# Patient Record
(demographics unavailable — no encounter records)

---

## 2024-11-15 NOTE — DISCUSSION/SUMMARY
[de-identified] : The "lipstick technique" was discussed with the patient in detail, and a metatarsal pad was provided to the patient.  Patient declined oral nsaids. She is willing to try Voltaran gel and will use tylenol. Icing to affected area. Supportive sneakers.

## 2024-11-15 NOTE — PHYSICAL EXAM
[NL (40)] : plantar flexion 40 degrees [NL 30)] : inversion 30 degrees [5___] : [facility] 5[unfilled]/5 [2+] : posterior tibialis pulse: 2+ [Normal] : saphenous nerve sensation normal [Mild] : mild swelling of MTP joint/great toe [1st] : 1st [4___] : [facility] 4[unfilled]/5 [] : mildly antalgic [Right] : right foot [Weight -] : weightbearing [There are no fractures, subluxations or dislocations. No significant abnormalities are seen] : There are no fractures, subluxations or dislocations. No significant abnormalities are seen [de-identified] : Sesamoid view unremarkable [TWNoteComboBox7] : dorsiflexion 10 degrees [de-identified] : eversion 15 degrees

## 2025-02-06 NOTE — HISTORY OF PRESENT ILLNESS
[FreeTextEntry1] : 72 year female  f/u for osteoporosis management.   *** Feb 06, 2025 ***  Remains on Actonel 150 mg monthly ( started in 05/23) Denies jaw/thigh pains No dental issues.  DXA (12/23/2024)-LS (-3.0) with L1 (-3.1), L2 (-3.9), left femoral neck (-2.0), radius 33% 9 -2.6) *** images reviewed  DXA (12/8/2023)-lumbar spine (-3.2), left femoral neck (-2.1), right femoral neck (-2.0) DXA(12/22/22)- LS (-2.9), LFN (-2.1), RFN (-1.9) DXA (12/21/20)- LS (-4.0), FN (-2.1)  *** Jerrod 10, 2024 ***  switched to Actonel 150 mg q mo no new c/o. no jaw/thigh pain no kidney stones s/p left 4th toe fracture in November after banging her toe  DXA (12/8/2023)-lumbar spine (-3.2), left femoral neck (-2.1), right femoral neck (-2.0) DXA(12/22/22)- LS (-2.9), LFN (-2.1), RFN (-1.9) DXA (12/21/20)- LS (-4.0), FN (-2.1)  *** May 03, 2023 ***  decided not to take her last Evenity injection (concerned with the cost) feels very well overall. no thigh or jaw pains.   *** Apr 03, 2023 ***  Patient returned for Evenity injection # 11 feels very well overall. no thigh or jaw pains.  Once again, risks and benefits were reviewed in details. REMS provided.  *** Evenity 105 mg/1.17 ml Lot # [id number] Exp. 6/25   *** Feb 13, 2023 ***  last Evenity in october, was expensive   DXA(12/22/22)- LS (-2.9), LFN (-2.1), RFN (-1.9)  *** Oct 10, 2022 ***  Patient returned for Evenity injection # 9 feels very well overall. no thigh or jaw pains. LBP is better since starting Evenity Once again, risks and benefits were reviewed in details. REMS provided.  *** Evenity 105 mg/1.17 ml Lot # [id number] Exp. 9/24  *** Sep 08, 2022 ***  Patient returned for Evenity injection # 8 *** Evenity 105 mg/ml Lot # [id number] Exp. 7/24  feels very well overall. no thigh or jaw pains Once again, risks and benefits were reviewed in details. REMS provided.  *** Jul 11, 2022 ***  Patient returned for Evenity injection # 7 did not have any lip swelling after the last injection. did not see an allergist yet feels very well overall. no thigh or jaw pains  Once again, risks and benefits were reviewed in details. REMS provided.  *** Evenity 105 mg/ml Lot # [id number] Exp. 6/24  *** Jun 06, 2022 ***  Patient returned for Evenity injection #6 went to [place] post last injection and 14 days after the injection noticed an upper lip swelling- resolved on its own. Had shell fish the day before. did not see an allergist yet  Once again, risks and benefits were reviewed in details. REMS provided.  *** Evenity 105 mg/ml Lot # [id number] Exp. 6/24  *** May 05, 2022 ***  Patient returned for Evenity injection #5. had an upper lip swelling 3 days after the last shot. no other symptoms, resolved with benadryl. With seasonal allergies now. taking Allegra PRN  Once again, risks and benefits were reviewed in details. REMS provided.  *** Evenity 105 mg/ml Lot # [id number] Exp. 7/24  *** Apr 07, 2022 ***  Patient returned for Evenity injection #4. tolerates well. no c/o. Wants to discuss last labs Once again, risks and benefits were reviewed in details. REMS provided.  *** Evenity 105 mg/ml Lot # [id number] Exp. 5/24  *** Mar 10, 2022 ***  Patient returned for Evenity injection #3. tolerates well. no c/o Once again, risks and benefits were reviewed in details. REMS provided.  *** Evenity 105 mg/ml Lot # [id number] Exp. 4/24   *** Feb 10, 2022 ***  Patient returned for Evenity injection. tolerated 1st injection well. no c/o Once again, risks and benefits were reviewed in details. REMS provided.  *** Evenity 105 mg/ml Lot # [id number] Exp. 4/24  *** Jerrod 10, 2022 ***  feels fine. stopped cholrthalidone b/o lightheadedness  Patient returned for PE and  Evenity injection. Once again, risks and benefits were reviewed in details. REMS provided.  *** Evenity 105 mg/ml Lot # [id number] Exp. 4/24  repeated 24h [facility]- 238 ([facility]) 2D-Echo (8/6/21)- normal EF, mild concentric LVH  *** Jul 12, 2021 ***  repeated 24h [facility] ([facility])- 387 feels fine, no new c/o. Planned for another echo at the beginning of August no more plans for dental work  HPI: She  was diagnosed with osteopenia in her late 40's. On Fosamax for a couple of years, switched to monthly bisphosphonates (probably Boniva) and took that for several years. Stopped her ART more than 10 years because her BMD improved. She was taking ca/D supplements, exercising daily.  Cancer history: none Calcium and vitamin D supplementation: 1200 mg + D Fracture history: none Bone disease risk factors: no history of kidney stones, liver disease, kidney disease, thyroid disease, anticonvulsant use, glucocorticoid use, autoimmune disorders such as rheumatoid arthritis and SLE, COPD, IBD, known malabsorption disorders, or weight loss. She does not smoke. Currently, no symptoms of polyuria, polydipsia, constipation, abdominal pain, mental confusion, depression, bone pain or fractures.  She  denies height loss, hot flashes, leg swelling, blood clots, history of radiation therapy. Lab:  calcium- 9.7 DXA (12/21/20)- LS (-4.0), FN (-2.1) DXA (3/4/14)- LS (-3.3), FN (-1.8) DXA (11/15/11)- LS (-2.7), FN (-1.4)

## 2025-02-06 NOTE — ASSESSMENT
[FreeTextEntry1] : Severe osteoporosis. Idiopathic hypercalciuria - had an excellent improvement in spine BMD after 9 injections of evenity. However, she had a gap of several months before getting injection # 10, and declined getting the last injection d/t the cost, which can explain the worsening in BMD - we reviewed a maintenance therapy with IV or oral bisphosphonates or Prolia - patient's preferred option is oral b/phos and would like to avoid Reclast or Prolia injections - cont Actonel 150 mg q mo (started in 05/23). R+B, proper intake reviewed - would avoid PTH analogues for now - repeat DXA 3 sites in 12/26 - calcium 500 mg bid, add extra OTC vitamin D3 2,000 IU/day - continue weight-bearing exercises; advised avoiding high risk sports - she wants to hold on Chlorthalidone for now. Will monitor 24 hrs U ca - labs next week RTC 1 year for a follow up visit

## 2025-02-17 NOTE — HISTORY OF PRESENT ILLNESS
[FreeTextEntry1] : Annual physical [de-identified] : 72y/F with PMH of osteoporosis, osteoarthritis, HTN presented for annual physical f/u with Dr Tan for osteoporosis, s/p evenity  C/o pain in right foot- plantar fasciitis S/p cough and cold in 12/2024- went to Urgent care, was treated with Antibiotics No active issues C/o hearing problems Seen by Endocrinologist on 02/205 for Osteoporosis management Currently Olmesartan Medoxomil 20 MG Oral Tablet; TAKE 1 TABLET DAILY (OFFICE VISIT NEEDED FOR FURTHER REFILLS) Risedronate Sodium 150 MG Oral Tablet; 1 tab PO once a month as directed. Take with a full glass of water, at least 30' before the first meal

## 2025-02-17 NOTE — REVIEW OF SYSTEMS
[Hearing Loss] : hearing loss [Joint Pain] : joint pain [Fever] : no fever [Chills] : no chills [Fatigue] : no fatigue [Hot Flashes] : no hot flashes [Night Sweats] : no night sweats [Recent Change In Weight] : ~T no recent weight change [Discharge] : no discharge [Pain] : no pain [Redness] : no redness [Vision Problems] : no vision problems [Itching] : no itching [Earache] : no earache [Nosebleed] : no nosebleeds [Hoarseness] : no hoarseness [Nasal Discharge] : no nasal discharge [Sore Throat] : no sore throat [Postnasal Drip] : no postnasal drip [Chest Pain] : no chest pain [Palpitations] : no palpitations [Leg Claudication] : no leg claudication [Lower Ext Edema] : no lower extremity edema [Orthopnea] : no orthopnea [Shortness Of Breath] : no shortness of breath [Wheezing] : no wheezing [Cough] : no cough [Dyspnea on Exertion] : no dyspnea on exertion [Abdominal Pain] : no abdominal pain [Nausea] : no nausea [Constipation] : no constipation [Diarrhea] : diarrhea [Vomiting] : no vomiting [Heartburn] : no heartburn [Melena] : no melena [Dysuria] : no dysuria [Incontinence] : no incontinence [Nocturia] : no nocturia [Hematuria] : no hematuria [Frequency] : no frequency [Joint Stiffness] : no joint stiffness [Joint Swelling] : no joint swelling [Muscle Weakness] : no muscle weakness [Muscle Pain] : no muscle pain [Itching] : no itching [Mole Changes] : no mole changes [Skin Rash] : no skin rash [Headache] : no headache [Dizziness] : no dizziness [Fainting] : no fainting [Unsteady Walking] : no ataxia [Suicidal] : not suicidal [Insomnia] : no insomnia [Anxiety] : no anxiety [Depression] : no depression [Easy Bleeding] : no easy bleeding [Easy Bruising] : no easy bruising [Swollen Glands] : no swollen glands [FreeTextEntry9] : has osteoarthritis, relieved with tylenol

## 2025-02-17 NOTE — HEALTH RISK ASSESSMENT
[Very Good] : ~his/her~ current health as very good [Good] : ~his/her~  mood as  good [Intercurrent Urgi Care visits] : went to urgent care [Yes] : Yes [1 or 2 (0 pts)] : 1 or 2 (0 points) [Never (0 pts)] : Never (0 points) [No] : In the past 12 months have you used drugs other than those required for medical reasons? No [No falls in past year] : Patient reported no falls in the past year [Little interest or pleasure doing things] : 1) Little interest or pleasure doing things [Feeling down, depressed, or hopeless] : 2) Feeling down, depressed, or hopeless [0] : 2) Feeling down, depressed, or hopeless: Not at all (0) [PHQ-2 Negative - No further assessment needed] : PHQ-2 Negative - No further assessment needed [Never] : Never [No Retinopathy] : No retinopathy [Patient reported mammogram was normal] : Patient reported mammogram was normal [Patient reported PAP Smear was normal] : Patient reported PAP Smear was normal [Patient reported bone density results were abnormal] : Patient reported bone density results were abnormal [HIV test declined] : HIV test declined [Hepatitis C test declined] : Hepatitis C test declined [None] : None [With Significant Other] : lives with significant other [Retired] : retired [] :  [# Of Children ___] : has [unfilled] children [Feels Safe at Home] : Feels safe at home [Fully functional (bathing, dressing, toileting, transferring, walking, feeding)] : Fully functional (bathing, dressing, toileting, transferring, walking, feeding) [Reports changes in hearing] : Reports changes in hearing [Smoke Detector] : smoke detector [Carbon Monoxide Detector] : carbon monoxide detector [Seat Belt] :  uses seat belt [Sunscreen] : uses sunscreen [Designated Healthcare Proxy] : Designated healthcare proxy [Name: ___] : Health Care Proxy's Name: [unfilled]  [Relationship: ___] : Relationship: [unfilled] [Aggressive treatment] : aggressive treatment [Time Spent: ___ minutes] : Time Spent: [unfilled] minutes [Reviewed no changes] : Reviewed, no changes [Monthly or less (1 pt)] : Monthly or less (1 point) [Time Spent: ___ Minutes] : I spent [unfilled] minutes performing a depression screening for this patient. [de-identified] : broken fourth toe 3 weeks ago [Audit-CScore] : 1 [de-identified] : workout  [de-identified] : fishes, veggies  fruit, chicken, seafood, occassionally red meat [[id number]] : 0 [EyeExamDate] : 2024 [Change in mental status noted] : No change in mental status noted [Language] : denies difficulty with language [Behavior] : denies difficulty with behavior [Learning/Retaining New Information] : denies difficulty learning/retaining new information [Reports changes in vision] : Reports no changes in vision [Reports normal functional visual acuity (ie: able to read med bottle)] : Reports poor functional visual acuity.  [Reports changes in dental health] : Reports no changes in dental health [Travel to Developing Areas] : does not  travel to developing areas [TB Exposure] : is not being exposed to tuberculosis [MammogramDate] : 01/01/2023 [PapSmearDate] : 01/01/2023 [BoneDensityDate] : 12/2024 [BoneDensityComments] : osteoporosis- improving [ColonoscopyDate] : 12/2023 [ColonoscopyComments] : Cologuard test - negative [AdvancecareDate] : 02/2025

## 2025-02-17 NOTE — PHYSICAL EXAM
[No Acute Distress] : no acute distress [Well Nourished] : well nourished [Normal Sclera/Conjunctiva] : normal sclera/conjunctiva [PERRL] : pupils equal round and reactive to light [Normal Outer Ear/Nose] : the outer ears and nose were normal in appearance [Normal Oropharynx] : the oropharynx was normal [No Lymphadenopathy] : no lymphadenopathy [Thyroid Normal, No Nodules] : the thyroid was normal and there were no nodules present [No Respiratory Distress] : no respiratory distress  [No Accessory Muscle Use] : no accessory muscle use [Clear to Auscultation] : lungs were clear to auscultation bilaterally [Normal Rate] : normal rate  [Regular Rhythm] : with a regular rhythm [Normal S1, S2] : normal S1 and S2 [Pedal Pulses Present] : the pedal pulses are present [No Edema] : there was no peripheral edema [Declined Breast Exam] : declined breast exam  [Soft] : abdomen soft [Non Tender] : non-tender [Non-distended] : non-distended [No Masses] : no abdominal mass palpated [No Joint Swelling] : no joint swelling [Grossly Normal Strength/Tone] : grossly normal strength/tone [Normal Affect] : the affect was normal [Normal Insight/Judgement] : insight and judgment were intact [Comprehensive Foot Exam Normal] : Right and left foot were examined and both feet are normal. No ulcers in either foot. Toes are normal and with full ROM.  Normal tactile sensation with monofilament testing throughout both feet

## 2025-02-17 NOTE — COUNSELING
[Fall prevention counseling provided] : Fall prevention counseling provided [Adequate lighting] : Adequate lighting [Use proper foot wear] : Use proper foot wear [Behavioral health counseling provided] : Behavioral health counseling provided [Engage in a relaxing activity] : Engage in a relaxing activity [Potential consequences of obesity discussed] : Potential consequences of obesity discussed [Benefits of weight loss discussed] : Benefits of weight loss discussed [Structured Weight Management Program suggested:] : Structured weight management program suggested [Weigh Self Weekly] : weigh self weekly [Decrease Portions] : decrease portions [Keep Food Diary] : keep food diary [None] : None

## 2025-02-27 NOTE — HISTORY OF PRESENT ILLNESS
[4] : 4 [0] : 0 [] : yes [de-identified] : 2/27/25: Here to f/up b/l knees. Reports that she did obtain relief s/p series of Visco-3 injections completed in June. She feels that she has started to experience a gradual return of pain, denies any new injury or trauma.  6/20/24: Pt here to f/u L knee, and visco 3 #2 . States able to sleep through night now, and as long as stays active at the gym, knees feel ok. Ices after gym to minimize symptoms.  6/13/24: here for f/up for b/l knees and visco-3 #1 5/14/24: Here for f/up for b/l knees. Reports she is interested in gel injections. Has returned from vacation.  04/04/2024 Ms. WILFRIDO VELASCO, a 71 year old female, presents today for b/l knees. Reports history of intermittent b/l knee pain, known OA. Saw Dr. Simon last month, visco injections denied by insurance. Pt interested in possible appraio program or other treatment options. Reports pain is L>R.  [FreeTextEntry1] : RICHIE knees  [FreeTextEntry5] : much relief from visco inj on left knee. Right pain worsen in the past month.

## 2025-02-27 NOTE — IMAGING
[Bilateral] : knee bilaterally [All Views] : anteroposterior, lateral, skyline, and anteroposterior standing [There are no fractures, subluxations or dislocations. No significant abnormalities are seen] : There are no fractures, subluxations or dislocations. No significant abnormalities are seen [Degenerative change] : Degenerative change

## 2025-02-27 NOTE — PHYSICAL EXAM
[Bilateral] : knee bilaterally [] : no extensor lag [TWNoteComboBox7] : flexion 120 degrees [de-identified] : extension 3 degrees

## 2025-02-27 NOTE — DISCUSSION/SUMMARY
[de-identified] : 72f with acute exacerbation of b/l knee djd.  Hx of relief with visco injections in the past Arthritis is a progressive problem that once occurs will be a chronic issue that will likely continue until surgical treatment is necessary. Treatment options for arthritis include OTC NSAIDS, prescription NSAIDS, ice, bracing, physical therapy, cortisone and/or visco injections, and surgery.  1) discussed availability of visco injections and pt would like to proceed. will request auth for a series of injections.  2) cryotherapy, rest and activity modification  3) hep  4) rtc with auth for injections   Entered by Sheridan Monterroso acting as scribe. Dr. Ledbetter- The documentation recorded by the scribe accurately reflects the service I personally performed and the decisions made by me.

## 2025-03-13 NOTE — PROCEDURE
[FreeTextEntry3] : Large joint injection was performed of the right knee. The indication for this procedure was pain, inflammation and x-ray evidence of Osteoarthritis on this or prior visit. The site was prepped with alcohol, betadine, ethyl chloride sprayed topically and sterile technique used. An injection of Lidocaine 3cc of 1% , 25mg of Visco-3, series # 1 was used.  Patient tolerated procedure well.    Patient has tried OTC's including aspirin, Ibuprofen, Aleve, etc or prescription NSAIDS, and/or exercises at home and/or physical therapy without satisfactory response, patient had decreased mobility in the joint and the risks benefits, and alternatives have been discussed, and verbal consent was obtained.   Ultrasound guidance was indicated for this patient due to prior failure or difficult injection. All ultrasound images have been permanently captured and stored accordingly in our picture archiving and communication system. Visualization of the needle and placement of injection was performed without complication.

## 2025-03-13 NOTE — PHYSICAL EXAM
[Bilateral] : knee bilaterally [] : no extensor lag [TWNoteComboBox7] : flexion 120 degrees [de-identified] : extension 3 degrees

## 2025-03-13 NOTE — DISCUSSION/SUMMARY
[de-identified] : 72f with right knee djd.  Hx of relief with visco injections in the past Arthritis is a progressive problem that once occurs will be a chronic issue that will likely continue until surgical treatment is necessary. Treatment options for arthritis include OTC NSAIDS, prescription NSAIDS, ice, bracing, physical therapy, cortisone and/or visco injections, and surgery. Visco-3 #1 Injection tolerated well. Post injection instructions reviewed. 1) wbat, cryotherapy 2) rtc 1 week to continue series.  Entered by Sheridan Monterroso acting as scribe. Dr. Ledbetter- The documentation recorded by the scribe accurately reflects the service I personally performed and the decisions made by me.

## 2025-03-13 NOTE — HISTORY OF PRESENT ILLNESS
[4] : 4 [0] : 0 [] : yes [de-identified] : 3/13/25: Here to f/up right knee. Reports continued right knee pain and stiffness.  2/27/25: Here to f/up b/l knees. Reports that she did obtain relief s/p series of Visco-3 injections completed in June. She feels that she has started to experience a gradual return of pain, denies any new injury or trauma.  6/20/24: Pt here to f/u L knee, and visco 3 #2 . States able to sleep through night now, and as long as stays active at the gym, knees feel ok. Ices after gym to minimize symptoms.  6/13/24: here for f/up for b/l knees and visco-3 #1 5/14/24: Here for f/up for b/l knees. Reports she is interested in gel injections. Has returned from vacation.  04/04/2024 Ms. WILFRIDO VELASCO, a 71 year old female, presents today for b/l knees. Reports history of intermittent b/l knee pain, known OA. Saw Dr. Simon last month, visco injections denied by insurance. Pt interested in possible appraio program or other treatment options. Reports pain is L>R.  [FreeTextEntry1] : RICHIE knees

## 2025-03-20 NOTE — PROCEDURE
[FreeTextEntry3] : Large joint injection was performed of the right knee. The indication for this procedure was pain, inflammation and x-ray evidence of Osteoarthritis on this or prior visit. The site was prepped with alcohol, betadine, ethyl chloride sprayed topically and sterile technique used. An injection of Lidocaine 3cc of 1% , 25mg of Visco-3, series # 2 was used.  Patient tolerated procedure well.    Patient has tried OTC's including aspirin, Ibuprofen, Aleve, etc or prescription NSAIDS, and/or exercises at home and/or physical therapy without satisfactory response, patient had decreased mobility in the joint and the risks benefits, and alternatives have been discussed, and verbal consent was obtained.   Ultrasound guidance was indicated for this patient due to prior failure or difficult injection. All ultrasound images have been permanently captured and stored accordingly in our picture archiving and communication system. Visualization of the needle and placement of injection was performed without complication.

## 2025-03-20 NOTE — DISCUSSION/SUMMARY
[de-identified] : 72f with right knee djd.  Hx of relief with visco injections in the past Arthritis is a progressive problem that once occurs will be a chronic issue that will likely continue until surgical treatment is necessary. Treatment options for arthritis include OTC NSAIDS, prescription NSAIDS, ice, bracing, physical therapy, cortisone and/or visco injections, and surgery.  Visco-3 #2 Injection tolerated well. Post injection instructions reviewed. 1) wbat, cryotherapy 2) rtc 1 week to continue series.  Entered by Sheridan Monterroso acting as scribe. Dr. Ledbetter- The documentation recorded by the scribe accurately reflects the service I personally performed and the decisions made by me.

## 2025-03-20 NOTE — HISTORY OF PRESENT ILLNESS
[4] : 4 [0] : 0 [] : yes [de-identified] : 3/20/25: Here to f/up right knee and visco-3 #2 3/13/25: Here to f/up right knee. Reports continued right knee pain and stiffness.  2/27/25: Here to f/up b/l knees. Reports that she did obtain relief s/p series of Visco-3 injections completed in June. She feels that she has started to experience a gradual return of pain, denies any new injury or trauma.  6/20/24: Pt here to f/u L knee, and visco 3 #2 . States able to sleep through night now, and as long as stays active at the gym, knees feel ok. Ices after gym to minimize symptoms.  6/13/24: here for f/up for b/l knees and visco-3 #1 5/14/24: Here for f/up for b/l knees. Reports she is interested in gel injections. Has returned from vacation.  04/04/2024 Ms. WILFRIDO VELASCO, a 71 year old female, presents today for b/l knees. Reports history of intermittent b/l knee pain, known OA. Saw Dr. Simon last month, visco injections denied by insurance. Pt interested in possible appraio program or other treatment options. Reports pain is L>R.  [FreeTextEntry1] : RICHIE knees  [FreeTextEntry5] : follow up visit, here for visco-3 #2

## 2025-03-20 NOTE — PHYSICAL EXAM
[Bilateral] : knee bilaterally [] : no extensor lag [TWNoteComboBox7] : flexion 120 degrees [de-identified] : extension 3 degrees

## 2025-03-24 NOTE — HISTORY OF PRESENT ILLNESS
[FreeTextEntry1] : 72F with HTN, HLD who presents for follow up janet  Last seen in February 2022 Feeling well overall Admits to generalized fatigue, does not sleep well at night No further CP. Rare palpitations. Denies dyspnea, dizziness, or exertional symptoms.  Now taking Risendronate once a month for osteoporosis   HISTORY:  Bone density in December with worsening osteoporosis, seeing endo, recommending PTH analogue Also noted on urine study with hypercalcuria, now recommending a thiazide diuretic  Never smoker. Denies family history of CAD. Works part time in retail.   ECG: SR with PRWP TTE 8/21: 60-65%, mild LVH  Olmesartan 20mg

## 2025-03-24 NOTE — DISCUSSION/SUMMARY
[FreeTextEntry1] : HTN:  Well controlled, continue Olmesartan 20mg. Normal CMP.    HLD: Lipids elevated, . Would likely benefit from statin. Pt reluctant to start due to side effects. Will plan for CAC to better guide. Continue to encourage diet, exercise  Mild LVH: Stable at last echo, would repeat study. Pt notes she had one recently, no records are available. She will try to obtain a copy and send it here  RV 6M

## 2025-03-27 NOTE — DISCUSSION/SUMMARY
[de-identified] : 72f with right knee djd.  Hx of relief with visco injections in the past Arthritis is a progressive problem that once occurs will be a chronic issue that will likely continue until surgical treatment is necessary. Treatment options for arthritis include OTC NSAIDS, prescription NSAIDS, ice, bracing, physical therapy, cortisone and/or visco injections, and surgery.  Visco-3 #3 Injection tolerated well. Post injection instructions reviewed. 1) wbat, cryotherapy 2) rtc 1 week to continue series.  Entered by Sheridan Monterroso acting as scribe. Dr. Ledbetter- The documentation recorded by the scribe accurately reflects the service I personally performed and the decisions made by me.

## 2025-03-27 NOTE — PHYSICAL EXAM
[Bilateral] : knee bilaterally [] : no extensor lag [TWNoteComboBox7] : flexion 120 degrees [de-identified] : extension 3 degrees

## 2025-03-27 NOTE — PROCEDURE
[FreeTextEntry3] : Large joint injection was performed of the right knee. The indication for this procedure was pain, inflammation and x-ray evidence of Osteoarthritis on this or prior visit. The site was prepped with alcohol, betadine, ethyl chloride sprayed topically and sterile technique used. An injection of Lidocaine 3cc of 1% , 25mg of Visco-3, series # 3 was used.  Patient tolerated procedure well.    Patient has tried OTC's including aspirin, Ibuprofen, Aleve, etc or prescription NSAIDS, and/or exercises at home and/or physical therapy without satisfactory response, patient had decreased mobility in the joint and the risks benefits, and alternatives have been discussed, and verbal consent was obtained.   Ultrasound guidance was indicated for this patient due to prior failure or difficult injection. All ultrasound images have been permanently captured and stored accordingly in our picture archiving and communication system. Visualization of the needle and placement of injection was performed without complication.

## 2025-03-27 NOTE — HISTORY OF PRESENT ILLNESS
[4] : 4 [0] : 0 [] : yes [de-identified] : 3/27/25: Here to f/up right knee and visco-3 #3 3/20/25: Here to f/up right knee and visco-3 #2 3/13/25: Here to f/up right knee. Reports continued right knee pain and stiffness.  2/27/25: Here to f/up b/l knees. Reports that she did obtain relief s/p series of Visco-3 injections completed in June. She feels that she has started to experience a gradual return of pain, denies any new injury or trauma.  6/20/24: Pt here to f/u L knee, and visco 3 #2 . States able to sleep through night now, and as long as stays active at the gym, knees feel ok. Ices after gym to minimize symptoms.  6/13/24: here for f/up for b/l knees and visco-3 #1 5/14/24: Here for f/up for b/l knees. Reports she is interested in gel injections. Has returned from vacation.  04/04/2024 Ms. WILFRIDO VELASCO, a 71 year old female, presents today for b/l knees. Reports history of intermittent b/l knee pain, known OA. Saw Dr. Simon last month, visco injections denied by insurance. Pt interested in possible appraio program or other treatment options. Reports pain is L>R.  [FreeTextEntry1] : RICHIE knees  [FreeTextEntry5] : follow up visit, here for visco-3 #2

## 2025-05-23 NOTE — DISCUSSION/SUMMARY
[de-identified] : 72f with right knee djd.  Hx of relief with visco injections in the past Arthritis is a progressive problem that once occurs will be a chronic issue that will likely continue until surgical treatment is necessary. Treatment options for arthritis include OTC NSAIDS, prescription NSAIDS, ice, bracing, physical therapy, cortisone and/or visco injections, and surgery.   1) wbat, cryotherapy, progress activity as tolerated, may return to the gym. Tylenol prn, unable to take NSAID's  2) rtc prn  Entered by Sheridan Monterroso acting as scribe. Dr. Ledbetter- The documentation recorded by the scribe accurately reflects the service I personally performed and the decisions made by me.

## 2025-05-23 NOTE — HISTORY OF PRESENT ILLNESS
[0] : 0 [] : yes [de-identified] : 5/22/25: Here to f/u R knee pain. Completed Visco-3 last visit with 90% relief. Reports no pain recently.  3/27/25: Here to f/up right knee and visco-3 #3 3/20/25: Here to f/up right knee and visco-3 #2 3/13/25: Here to f/up right knee. Reports continued right knee pain and stiffness.  2/27/25: Here to f/up b/l knees. Reports that she did obtain relief s/p series of Visco-3 injections completed in June. She feels that she has started to experience a gradual return of pain, denies any new injury or trauma.  6/20/24: Pt here to f/u L knee, and visco 3 #2 . States able to sleep through night now, and as long as stays active at the gym, knees feel ok. Ices after gym to minimize symptoms.  6/13/24: here for f/up for b/l knees and visco-3 #1 5/14/24: Here for f/up for b/l knees. Reports she is interested in gel injections. Has returned from vacation.  04/04/2024 Ms. WILFRIDO VELASCO, a 71 year old female, presents today for b/l knees. Reports history of intermittent b/l knee pain, known OA. Saw Dr. Simon last month, visco injections denied by insurance. Pt interested in possible appraio program or other treatment options. Reports pain is L>R.  [FreeTextEntry1] : R knee

## 2025-05-23 NOTE — PHYSICAL EXAM
[Right] : right knee [] : no extensor lag [FreeTextEntry3] : minimal effusion [TWNoteComboBox7] : flexion 120 degrees [de-identified] : extension 3 degrees

## 2025-05-23 NOTE — PHYSICAL EXAM
[Right] : right knee [] : no extensor lag [FreeTextEntry3] : minimal effusion [TWNoteComboBox7] : flexion 120 degrees [de-identified] : extension 3 degrees

## 2025-05-23 NOTE — HISTORY OF PRESENT ILLNESS
[0] : 0 [] : yes [de-identified] : 5/22/25: Here to f/u R knee pain. Completed Visco-3 last visit with 90% relief. Reports no pain recently.  3/27/25: Here to f/up right knee and visco-3 #3 3/20/25: Here to f/up right knee and visco-3 #2 3/13/25: Here to f/up right knee. Reports continued right knee pain and stiffness.  2/27/25: Here to f/up b/l knees. Reports that she did obtain relief s/p series of Visco-3 injections completed in June. She feels that she has started to experience a gradual return of pain, denies any new injury or trauma.  6/20/24: Pt here to f/u L knee, and visco 3 #2 . States able to sleep through night now, and as long as stays active at the gym, knees feel ok. Ices after gym to minimize symptoms.  6/13/24: here for f/up for b/l knees and visco-3 #1 5/14/24: Here for f/up for b/l knees. Reports she is interested in gel injections. Has returned from vacation.  04/04/2024 Ms. WILFRIDO VELASCO, a 71 year old female, presents today for b/l knees. Reports history of intermittent b/l knee pain, known OA. Saw Dr. Simon last month, visco injections denied by insurance. Pt interested in possible appraio program or other treatment options. Reports pain is L>R.  [FreeTextEntry1] : R knee

## 2025-05-23 NOTE — DISCUSSION/SUMMARY
[de-identified] : 72f with right knee djd.  Hx of relief with visco injections in the past Arthritis is a progressive problem that once occurs will be a chronic issue that will likely continue until surgical treatment is necessary. Treatment options for arthritis include OTC NSAIDS, prescription NSAIDS, ice, bracing, physical therapy, cortisone and/or visco injections, and surgery.   1) wbat, cryotherapy, progress activity as tolerated, may return to the gym. Tylenol prn, unable to take NSAID's  2) rtc prn  Entered by Sheridan Monterroso acting as scribe. Dr. Ledbetter- The documentation recorded by the scribe accurately reflects the service I personally performed and the decisions made by me.

## 2025-06-10 NOTE — DISCUSSION/SUMMARY
[FreeTextEntry1] : Hypertensive event vs TIA vs TGA vs Vasovagal ?  - Continue Asa, statin. BP control  -Cardiac testing WNL  HTN:  BP decent today. Readings at home stable. Continue Olmesartan 20mg. K 4.2, Crt 0.82. Monitor closely.   HLD: Lipids great, LDL 69,  (in hospital). Now on rosuvastatin 20mg. Continue to encourage diet, exercise  Mild LVH: Stable at last echo, none on recent study from hospital. Continue to optimize BP control.   Mild CAC: Continue asa, statin. Lipids much improved   RV 6M

## 2025-06-10 NOTE — HISTORY OF PRESENT ILLNESS
[FreeTextEntry1] : 72F with HTN, HLD who presents for hospital follow up   S/p admission to [facility] on 5/13 for transient altered mental status  Was dehydrated, experienced confusion post intimate relations with her   /80 upon arrival, MRI brain/ CT head neg, TTE with normal EF   Symptoms resolved within 24 hours, started on Asa 81mg, rosuvastatin increased to 20mg  ?TIA vs TGA  Had CAC score in April 2025, was 94, started on rosuvastatin 10mg, increased to 20mg while in hospital   Feeling much better now Denies chest pain, shortness of breath, palpitations, dizziness, lightheadedness, syncope. Continues to monitor BP at home, ~130s/80s No further episodes of confusion   A1c 5.3%, LDL 69  HISTORY:  Bone density in December with worsening osteoporosis, seeing endo, recommending PTH analogue Also noted on urine study with hypercalcuria, now recommending a thiazide diuretic  Never smoker. Denies family history of CAD. Works part time in retail.   ECG: SR with septal q waves CT heart 4/2025: CAC of 94, 6mm groundglass nodule (under care of pulm - Dr Mcclelland) TTE 8/21: 60-65%, mild LVH TTE 5/2025: 60-64%, no evidence of interatrial shunt. Mild DD, trace TR, trace AI, trace MR, trace SD.  Asa 81mg Rosuvastatin 20mg  Olmesartan 20mg

## 2025-06-11 NOTE — PHYSICAL EXAM
[TextEntry] : General: alert, awake, oriented  X 3, in no acute distress.  Eyes: PERRLA, EOM's  are intact b/l , conjunctiva clear,  Ears: Ear canal  is  clear b/l , no discharge. Tympanic membrane is   intact b/l Nose: Mucosa normal, no obstruction.  Throat: Clear, no exudates, no lesions.  Neck: Supple, no masses,  Chest: Lungs are  clear b/l , no rales, no rhonchi, no wheezes.  Heart: RR, no murmurs, no rubs, no gallops.  Abdomen: Soft, no tenderness in all quadrant , no masses, BS normal.  Extremities: FROM, no deformities, no edema, no erythema.  Neuro: Physiological, no localizing findings.  Skin: Normal, no rashes, no lesions noted.

## 2025-06-11 NOTE — HISTORY OF PRESENT ILLNESS
[de-identified] : 72F with HTN, HLD who presents for hospital follow up S/p admission to [facility] on 5/13 for transient altered mental status Was dehydrated, experienced confusion post intimate relations with her  /80 upon arrival, MRI brain/ CT head neg, TTE with normal EF Symptoms resolved within 24 hours, started on Asa 81mg, rosuvastatin increased to 20mg ?TIA vs TGA Feeling much better now Denies chest pain, shortness of breath, palpitations, dizziness, lightheadedness, syncope. Continues to monitor BP at home, ~130s/80s No further episodes of confusion Last DEXA test was  done on 12/2024- Osteoporosis, improving, recommend to continue with Endo recommendations Seen by Cardiologist on 06/10/2025- recommend to continue current management and medications adjustment Seen by Pulmonologist for lung nodule monitor- last seen in05/2025

## 2025-06-11 NOTE — REVIEW OF SYSTEMS
[TextEntry] : Head: Denies headache, dizziness Eyes: No acute  vision problem  Ears: Denies hearing loss, tinnitus, no ear pain Nose: Denies nasal obstruction or any discharges Neck: Denies stiffness or muscle tenderness Chest: Denies cough, SOB CV: Denies chest pain, palpitation Abdominal: Denies abdominal pain, change in bowel movement Neurology: Denies  changes in mental status, seizure, no neurological deficit